# Patient Record
Sex: MALE | Race: WHITE | NOT HISPANIC OR LATINO
[De-identification: names, ages, dates, MRNs, and addresses within clinical notes are randomized per-mention and may not be internally consistent; named-entity substitution may affect disease eponyms.]

---

## 2022-09-21 ENCOUNTER — APPOINTMENT (OUTPATIENT)
Dept: CARDIOLOGY | Facility: CLINIC | Age: 67
End: 2022-09-21

## 2022-09-21 VITALS — WEIGHT: 203.25 LBS | HEIGHT: 71 IN | BODY MASS INDEX: 28.45 KG/M2

## 2022-09-21 DIAGNOSIS — Z82.49 FAMILY HISTORY OF ISCHEMIC HEART DISEASE AND OTHER DISEASES OF THE CIRCULATORY SYSTEM: ICD-10-CM

## 2022-09-21 PROCEDURE — 99205 OFFICE O/P NEW HI 60 MIN: CPT

## 2022-09-21 RX ORDER — ENALAPRIL MALEATE 10 MG/1
10 TABLET ORAL
Refills: 0 | Status: ACTIVE | COMMUNITY

## 2022-09-24 ENCOUNTER — NON-APPOINTMENT (OUTPATIENT)
Age: 67
End: 2022-09-24

## 2022-09-29 ENCOUNTER — APPOINTMENT (OUTPATIENT)
Dept: CARDIOLOGY | Facility: CLINIC | Age: 67
End: 2022-09-29

## 2022-09-29 VITALS
WEIGHT: 200 LBS | HEIGHT: 71 IN | BODY MASS INDEX: 28 KG/M2 | SYSTOLIC BLOOD PRESSURE: 130 MMHG | DIASTOLIC BLOOD PRESSURE: 80 MMHG

## 2022-09-29 PROCEDURE — 93306 TTE W/DOPPLER COMPLETE: CPT

## 2022-10-20 ENCOUNTER — NON-APPOINTMENT (OUTPATIENT)
Age: 67
End: 2022-10-20

## 2022-11-16 ENCOUNTER — OUTPATIENT (OUTPATIENT)
Dept: OUTPATIENT SERVICES | Facility: HOSPITAL | Age: 67
LOS: 1 days | Discharge: HOME | End: 2022-11-16

## 2022-11-16 ENCOUNTER — TRANSCRIPTION ENCOUNTER (OUTPATIENT)
Age: 67
End: 2022-11-16

## 2022-11-16 ENCOUNTER — RESULT REVIEW (OUTPATIENT)
Age: 67
End: 2022-11-16

## 2022-11-16 DIAGNOSIS — I20.9 ANGINA PECTORIS, UNSPECIFIED: ICD-10-CM

## 2022-11-16 PROCEDURE — 75574 CT ANGIO HRT W/3D IMAGE: CPT | Mod: 26,MH

## 2022-11-17 ENCOUNTER — OUTPATIENT (OUTPATIENT)
Dept: OUTPATIENT SERVICES | Facility: HOSPITAL | Age: 67
LOS: 1 days | Discharge: HOME | End: 2022-11-17
Payer: MEDICARE

## 2022-11-17 ENCOUNTER — RESULT REVIEW (OUTPATIENT)
Age: 67
End: 2022-11-17

## 2022-11-17 DIAGNOSIS — I20.9 ANGINA PECTORIS, UNSPECIFIED: ICD-10-CM

## 2022-11-17 PROCEDURE — 0504T: CPT

## 2022-11-18 NOTE — CARDIOLOGY SUMMARY
[de-identified] : 1. Dense calcified plaque precludes evaluation of the mid LAD and proximal left circumflex arteries.\par \par Atherosclerotic disease elsewhere within the coronary arteries contributing up to mild to moderate stenosis within the distal RCA.\par \par The total Agatston coronary artery calcium score equals 1445, which corresponds to 0th percentile for age, gender and ethnicity.\par \par CAD-RADS 2/3N.\par \par This case was sent for CT FFR analysis.\par \par 2. 4 mm subsolid right middle lobe nodule, CT of the chest in 12 months may be obtained for follow-up.\par \par FFR 0.78 LAD and 0.80 LCx

## 2022-11-18 NOTE — DISCUSSION/SUMMARY
[FreeTextEntry1] : pt with family h/o signficant with ? anginal symptoms \par ekg mildly abnormal \par get CTA as significant family /o \par get echo \par get bloodwork \par \par 11/18/22: \par pt with signifcant with LAD disease and 0.78 \par CAC: 1448\par pt for cardiac cath \par pt told not to be exertional.

## 2022-12-04 ENCOUNTER — LABORATORY RESULT (OUTPATIENT)
Age: 67
End: 2022-12-04

## 2022-12-07 ENCOUNTER — OUTPATIENT (OUTPATIENT)
Dept: OUTPATIENT SERVICES | Facility: HOSPITAL | Age: 67
LOS: 1 days | Discharge: HOME | End: 2022-12-07

## 2022-12-07 VITALS
HEART RATE: 63 BPM | OXYGEN SATURATION: 98 % | RESPIRATION RATE: 18 BRPM | TEMPERATURE: 97 F | SYSTOLIC BLOOD PRESSURE: 130 MMHG | DIASTOLIC BLOOD PRESSURE: 78 MMHG

## 2022-12-07 LAB
ANION GAP SERPL CALC-SCNC: 6 MMOL/L — LOW (ref 7–14)
BUN SERPL-MCNC: 25 MG/DL — HIGH (ref 10–20)
CALCIUM SERPL-MCNC: 9.5 MG/DL — SIGNIFICANT CHANGE UP (ref 8.4–10.4)
CHLORIDE SERPL-SCNC: 105 MMOL/L — SIGNIFICANT CHANGE UP (ref 98–110)
CO2 SERPL-SCNC: 28 MMOL/L — SIGNIFICANT CHANGE UP (ref 17–32)
CREAT SERPL-MCNC: 1.4 MG/DL — SIGNIFICANT CHANGE UP (ref 0.7–1.5)
EGFR: 55 ML/MIN/1.73M2 — LOW
GLUCOSE SERPL-MCNC: 93 MG/DL — SIGNIFICANT CHANGE UP (ref 70–99)
HCT VFR BLD CALC: 40.2 % — LOW (ref 42–52)
HCT VFR BLD CALC: 45.1 % — SIGNIFICANT CHANGE UP (ref 42–52)
HGB BLD-MCNC: 12.7 G/DL — LOW (ref 14–18)
HGB BLD-MCNC: 14.7 G/DL — SIGNIFICANT CHANGE UP (ref 14–18)
MCHC RBC-ENTMCNC: 29.5 PG — SIGNIFICANT CHANGE UP (ref 27–31)
MCHC RBC-ENTMCNC: 30 PG — SIGNIFICANT CHANGE UP (ref 27–31)
MCHC RBC-ENTMCNC: 31.6 G/DL — LOW (ref 32–37)
MCHC RBC-ENTMCNC: 32.6 G/DL — SIGNIFICANT CHANGE UP (ref 32–37)
MCV RBC AUTO: 92 FL — SIGNIFICANT CHANGE UP (ref 80–94)
MCV RBC AUTO: 93.3 FL — SIGNIFICANT CHANGE UP (ref 80–94)
NRBC # BLD: 0 /100 WBCS — SIGNIFICANT CHANGE UP (ref 0–0)
NRBC # BLD: 0 /100 WBCS — SIGNIFICANT CHANGE UP (ref 0–0)
PLATELET # BLD AUTO: 156 K/UL — SIGNIFICANT CHANGE UP (ref 130–400)
PLATELET # BLD AUTO: 181 K/UL — SIGNIFICANT CHANGE UP (ref 130–400)
POTASSIUM SERPL-MCNC: 4.9 MMOL/L — SIGNIFICANT CHANGE UP (ref 3.5–5)
POTASSIUM SERPL-SCNC: 4.9 MMOL/L — SIGNIFICANT CHANGE UP (ref 3.5–5)
RBC # BLD: 4.31 M/UL — LOW (ref 4.7–6.1)
RBC # BLD: 4.9 M/UL — SIGNIFICANT CHANGE UP (ref 4.7–6.1)
RBC # FLD: 12.4 % — SIGNIFICANT CHANGE UP (ref 11.5–14.5)
RBC # FLD: 12.4 % — SIGNIFICANT CHANGE UP (ref 11.5–14.5)
SODIUM SERPL-SCNC: 139 MMOL/L — SIGNIFICANT CHANGE UP (ref 135–146)
WBC # BLD: 5.37 K/UL — SIGNIFICANT CHANGE UP (ref 4.8–10.8)
WBC # BLD: 5.72 K/UL — SIGNIFICANT CHANGE UP (ref 4.8–10.8)
WBC # FLD AUTO: 5.37 K/UL — SIGNIFICANT CHANGE UP (ref 4.8–10.8)
WBC # FLD AUTO: 5.72 K/UL — SIGNIFICANT CHANGE UP (ref 4.8–10.8)

## 2022-12-07 PROCEDURE — 93010 ELECTROCARDIOGRAM REPORT: CPT

## 2022-12-07 PROCEDURE — 93010 ELECTROCARDIOGRAM REPORT: CPT | Mod: 77

## 2022-12-07 PROCEDURE — 92928 PRQ TCAT PLMT NTRAC ST 1 LES: CPT | Mod: LC

## 2022-12-07 PROCEDURE — 93571 IV DOP VEL&/PRESS C FLO 1ST: CPT | Mod: 26,LD

## 2022-12-07 PROCEDURE — 93458 L HRT ARTERY/VENTRICLE ANGIO: CPT | Mod: 26,XU

## 2022-12-07 RX ORDER — ATORVASTATIN CALCIUM 80 MG/1
1 TABLET, FILM COATED ORAL
Qty: 0 | Refills: 0 | DISCHARGE

## 2022-12-07 RX ORDER — CLOPIDOGREL BISULFATE 75 MG/1
1 TABLET, FILM COATED ORAL
Qty: 30 | Refills: 2
Start: 2022-12-07 | End: 2023-03-06

## 2022-12-07 RX ORDER — ATORVASTATIN CALCIUM 80 MG/1
1 TABLET, FILM COATED ORAL
Qty: 30 | Refills: 2
Start: 2022-12-07 | End: 2023-03-06

## 2022-12-07 NOTE — H&P CARDIOLOGY - HISTORY OF PRESENT ILLNESS
Patient is a 67y Male who presents to the cardiology department for LHC.       Pre cath note:    indication:  [ ] STEMI                [ ] NSTEMI                 [ ] Acute coronary syndrome                     [ ]Unstable Angina   [ ] high risk  [ ] intermediate risk  [ ] low risk                     [X ] Stable Angina     non-invasive testing:                          Date:       11/16/22              result: [ ] high risk  [x intermediate risk  [ ] low risk    Anti- Anginal medications:                    [ ] not used                       [X ] used                   [ ] not used but strong indication not to use    Ejection Fraction                   [ ] <29            [ ] 30-39%   [ ] 40-49%     [X ]>50%    CHF                   [ ] active (within last 14 days on meds   [ ] Chronic (on meds but no exacerbation)    COPD                   [ ] mild (on chronic bronchodilators)  [ ] moderate (on chronic steroid therapy)      [ ] severe (indication for home O2 or PACO2 >50)    Other risk factors:                       [ ] Previous MI                     [ ] CVA/ stroke                    [ ] carotid stent/ CEA                    [ ] PVD/PAD- (arterial aneurysm, non-palpable pulses, tortuous vessel with inability to insert catheter, infra-renal dissection, renal or subclavian artery stenosis)                    [ ] diabetic                    [ ] previous CABG                    [ ] Renal Failure        cc/hr x 1 hour prior to cath   EF 64% 10/2022  Adjusted Bleeding Score: LABS PENDING     SUBJ:  66 y/o male presents for LHC.  Pt c/o chest discomfort described as warmth with associated CEJA. Pt had CCTA     CT Angio Heart and Coronaries w/ IV Cont (11.16.22 @ 12:01)   INTERPRETATION:  CLINICAL INDICATION: Chest pain.      INTERPRETATION:    Calcium Score:    Vessel              Calcium Score    =======================================================  LM:                    32  LAD:                 581  LCX:   424  RCA:                 408  =======================================================  Total:                1445      CORONARY CT ANGIOGRAM:    There is a  right   dominant coronary arterial system.    Left Main Artery: Focal calcified plaque without significant stenosis.    Left Anterior Descending Artery: Mixed plaque proximally without   significant stenosis. Dense calcified plaque within the mid segment   limiting intraluminal evaluation.    Left Circumflex Artery: Dense calcified plaqueproximally limiting   intraluminal evaluation.    Right Coronary Artery: Scattered areas of calcified and noncalcified   plaque contributing up to mild to moderate stenosis distally.    CARDIAC MORPHOLOGY: The cardiac chambers are normal in size.  There is no   pericardial effusion.    IMAGED AORTA: The thoracic aorta is normal in caliber.    IMAGED EXTRACARDIAC FINDINGS:  Left Bochdalek hernia.  4 mm subsolid right middle lobe nodule abutting the minor fissure image   81 series 16.    IMPRESSION:    1. Dense calcified plaque precludes evaluation of the mid LAD and   proximal left circumflex arteries.    Atherosclerotic disease elsewhere within the coronary arteries   contributing up to mild to moderate stenosis within the distal RCA.    The total Agatston coronary artery calcium score equals 1445, which   corresponds to 0th percentile for age, gender and ethnicity.    CAD-RADS 2/3N.    This case was sent for CT FFR analysis.    2. 4 mm subsolid right middle lobe nodule, CT of the chest in 12 months   may be obtained for follow-up.       CT Fractional Flow Reserve (FFR-CT) Data Prep Transmission And Analysis Interpretation (11.17.22 @ 09:32)   ACC: [id number] EXAM:  FFR CT DATA PREP AND ANALYSIS#                          INTERPRETATION:  FFR-CT CORONARY ANALYSIS    CLINICAL INDICATION: Chest pain with abnormal coronary CCTA requiring   functional analysis.  Pertinent Coronary CTA findings: Mild to moderate distal RCA stenosis.   Dense calcified plaque within the coronary arteries limiting intraluminal   evaluation.    FINDINGS:    Left Main: End vessel CT-FFR value of 0.97. No evidence of a   hemodynamically significant focal lesion.    LAD: Progressive pressure loss with a distal vessel value of 0.71, likely   due to plaque burden versus poor vascular health.    LCX: End vessel CT-FFR value of 0.88. No evidence of a hemodynamically   significant focal lesion.    OM1: CT FFR distal to a proximal lesion is 0.80, with a delta is FFR of   0.13, likely hemodynamically significant.    RCA: End vessel CT-FFR value of 0.90. No evidence of a hemodynamically   significant focal lesion.      IMPRESSION:    Focal hemodynamically significant lesion within the first obtuse marginal   branch.    Progressive pressure loss with a distal vessel value of 0.71, likely due   to plaque burden versus poor vascular health    Reference FFRCT Values:    a. >0.80 (distal to the stenosis): Not hemodynamically significant    b. >0.76-0.8 (distal to the stenosis): Borderline hemodynamically   significant    c. ?0.75 (distal to the stenosis): Hemodynamically significant  This document has been electronically signed. Nov 18 2022  9:03AM        Past Medical History:  HTH  HLD      Past Surgical History:    R hip replace  Hernia repair    REVIEW OF SYSTEMS:  CONSTITUTIONAL: No fever, weight loss, or fatigue  CARDIOLOGY: Patient denies chest pain, shortness of breath or syncopal episodes.   RESPIRATORY: denies shortness of breath, wheezing.   NEUROLOGICAL: NO weakness, no focal deficits to report.  GI: no BRBPR, no N,V,diarrhea.     PHYSICAL EXAM:  · CONSTITUTIONAL:	Well-developed, well nourished     · RESPIRATORY:   airway patent; breath sounds equal; good air movement; respirations non-labored; clear to auscultation bilaterally; no chest wall tenderness; no intercostal retractions; no rales,rhonchi or wheeze  · CARDIOVASCULAR	regular rate and rhythm  no rub  no murmur    · EXTREMITIES: No cyanosis, clubbing or edema  · VASCULAR: 	Equal and normal pulses (carotid, femoral, dorsalis pedis)  	  Juan Manuel Test WNL

## 2022-12-07 NOTE — CHART NOTE - NSCHARTNOTEFT_GEN_A_CORE
PRE-OP DIAGNOSIS:  CAD - unstable Angina (AUC 8)      PROCEDURE:     [x] Coronary Angiogram     [x] LHC     [] LVG     [] RHC     [] Intervention (see below)         PHYSICIAN:  Dr. watson    Fellow:  Dr. Nava, Dr. Webb    PROCEDURE DESCRIPTION:     Consent:      [x] Patient     [] Family Member     []  Used        Anesthesia:     [] General     [x] Sedation     [x] Local        Access & Closure:     [x] 6-Fr Radial Artery ==> D-STAT    [] Fr Femoral Artery     [] Fr Femoral Vein     [] Fr Brachial Vein       IV Contrast: 150mL        Intervention: PCI w/ balloon angioplasty and SYNERGY XD 2.92C54VX MIRTA to prox OM1. FFR to prox LAD.      Implants: SYNERGY XD 2.33M14TI       FINDINGS:     Coronary Dominance: Right      LM: Mild luminal irregularities    LAD: Diffuse 60% stenosis in the proximal segment of the vessel. FFR 0.74.    CX: Mild diffuse disease  OM1: Proximal hazy 80% lesion s/p PCI w/ balloon angioplasty and MIRTA.      RCA: Mild diffuse disease  RPDA: Mild diffuse disease       LVEDP: mmHg     EF: 60% on 2D echo       ESTIMATED BLOOD LOSS: < 10 mL        CONDITION:     [x] Good     [] Fair     [] Critical        SPECIMEN REMOVED: N/A       POST-OP DIAGNOSIS:      [] Normal Coronary Angiogram     [] Mild Coronary Artery Disease (< 50% stenosis)     [x] 2-Vessel Coronary Artery Disease (OM1 s/p PCI w/ balloon angioplasty and SYNERGY XD 2.86A58JX MIRTA, Prox LDA 60% stenosis)       PLAN OF CARE:     [x] D/C Home Today     [] Return to In-patient bed     [] Admit for observation     [x] Return for Staged Procedure     [] CT Surgery Consult     [x] Medications: ASA/Plavix/BB/ARB. Increase Lipitor to 80mg.    [x] IV Fluids: 200cc/hr x 4 hours PRE-OP DIAGNOSIS:  CAD - unstable Angina (AUC 8)      PROCEDURE:     [x] Coronary Angiogram     [x] LHC     [] LVG     [] RHC     [] Intervention (see below)         PHYSICIAN:  Dr. watson    Fellow:  Dr. Nava, Dr. Webb    PROCEDURE DESCRIPTION:     Consent:      [x] Patient     [] Family Member     []  Used        Anesthesia:     [] General     [x] Sedation     [x] Local        Access & Closure:     [x] 6-Fr Radial Artery ==> D-STAT    [] Fr Femoral Artery     [] Fr Femoral Vein     [] Fr Brachial Vein       IV Contrast: 150mL        Intervention: PCI w/ balloon angioplasty and SYNERGY XD 2.05P72YU MIRTA to prox OM1. FFR to prox LAD.      Implants: SYNERGY XD 2.96U88KB       FINDINGS:     Coronary Dominance: Right      LM: Mild luminal irregularities    LAD: Diffuse 60% stenosis in the proximal segment of the vessel. FFR 0.74.    CX: Mild diffuse disease  OM1: Proximal hazy 80% lesion s/p PCI w/ balloon angioplasty and MIRTA.      RCA: Mild diffuse disease  RPDA: Mild diffuse disease       LVEDP: mmHg     EF: 60% on 2D echo       ESTIMATED BLOOD LOSS: < 10 mL        CONDITION:     [x] Good     [] Fair     [] Critical        SPECIMEN REMOVED: N/A       POST-OP DIAGNOSIS:      [] Normal Coronary Angiogram     [] Mild Coronary Artery Disease (< 50% stenosis)     [x] 2-Vessel Coronary Artery Disease (OM1 s/p PCI w/ balloon angioplasty and SYNERGY XD 2.55F56DB MIRTA, Prox LDA 60% stenosis)       PLAN OF CARE:     [x] D/C Home Today     [] Return to In-patient bed     [] Admit for observation     [x] Return for Staged Procedure     [] CT Surgery Consult     [x] Medications: ASA/Plavix/BB/ACEI. Increase Lipitor to 80mg.    [x] IV Fluids: 200cc/hr x 4 hours PRE-OP DIAGNOSIS:  CAD - unstable Angina (AUC 8)      PROCEDURE:     [x] Coronary Angiogram     [x] LHC     [] LVG     [] RHC     [] Intervention (see below)         PHYSICIAN:  Dr. watson    Fellow:  Dr. Nava, Dr. Webb    PROCEDURE DESCRIPTION:     Consent:      [x] Patient     [] Family Member     []  Used        Anesthesia:     [] General     [x] Sedation     [x] Local        Access & Closure:     [x] 6-Fr Radial Artery ==> D-STAT    [] Fr Femoral Artery     [] Fr Femoral Vein     [] Fr Brachial Vein       IV Contrast: 150mL        Intervention: PCI w/ balloon angioplasty and SYNERGY XD 2.46R32OZ MIRTA to prox OM1. FFR to prox LAD.      Implants: SYNERGY XD 2.64I41SU       FINDINGS:     Coronary Dominance: Right      LM: Mild luminal irregularities    LAD: Diffuse 60% stenosis in the proximal segment of the vessel. FFR 0.74.    CX: Mild diffuse disease  OM1: Proximal hazy 80% lesion s/p PCI w/ balloon angioplasty and MIRTA.      RCA: Mild diffuse disease  RPDA: Mild diffuse disease       LVEDP: mmHg     EF: 60% on 2D echo       ESTIMATED BLOOD LOSS: < 10 mL        CONDITION:     [x] Good     [] Fair     [] Critical        SPECIMEN REMOVED: N/A       POST-OP DIAGNOSIS:      [] Normal Coronary Angiogram     [] Mild Coronary Artery Disease (< 50% stenosis)     [x] 2-Vessel Coronary Artery Disease (OM1 s/p PCI w/ balloon angioplasty and SYNERGY XD 2.96X68NA MIRTA, Prox LDA 60% stenosis)       PLAN OF CARE:     [x] D/C Home Today     [] Return to In-patient bed     [] Admit for observation     [x] Return for Staged Procedure     [] CT Surgery Consult     [x] Medications: ASA/Plavix/BB/ACEI. Increase Lipitor to 40mg.    [x] IV Fluids: 200cc/hr x 4 hours PRE-OP DIAGNOSIS:  CAD - unstable Angina (AUC 8)      PROCEDURE:     [x] Coronary Angiogram     [x] LHC     [] LVG     [] RHC     [] Intervention (see below)         PHYSICIAN:  Dr. watson    Fellow:  Dr. Nava, Dr. Webb    PROCEDURE DESCRIPTION:     Consent:      [x] Patient     [] Family Member     []  Used        Anesthesia:     [] General     [x] Sedation     [x] Local        Access & Closure:     [x] 6-Fr Radial Artery ==> D-STAT    [] Fr Femoral Artery     [] Fr Femoral Vein     [] Fr Brachial Vein       IV Contrast: 150mL        Intervention: PCI w/ balloon angioplasty and SYNERGY XD 2.57J92HT MIRTA to prox OM1. FFR to prox LAD.      Implants: SYNERGY XD 2.62K47XG       FINDINGS:     Coronary Dominance: Right      LM: Mild luminal irregularities    LAD: Diffuse 60% stenosis in the proximal segment of the vessel. FFR 0.74.    CX: Mild diffuse disease  OM1: Proximal hazy 80% lesion s/p PCI w/ balloon angioplasty and MIRTA.      RCA: Mild diffuse disease  RPDA: Mild diffuse disease       LVEDP: mmHg     EF: 60% on 2D echo       ESTIMATED BLOOD LOSS: < 10 mL        CONDITION:     [x] Good     [] Fair     [] Critical        SPECIMEN REMOVED: N/A       POST-OP DIAGNOSIS:      [] Normal Coronary Angiogram     [] Mild Coronary Artery Disease (< 50% stenosis)     [x] 2-Vessel Coronary Artery Disease (OM1 s/p PCI w/ balloon angioplasty and SYNERGY XD 2.31R05VR MIRTA, Prox LAD 60% stenosis)       PLAN OF CARE:     [x] D/C Home Today     [] Return to In-patient bed     [] Admit for observation     [x] Return for Staged Procedure     [] CT Surgery Consult     [x] Medications: ASA/Plavix/BB/ACEI. Increase Lipitor to 40mg.    [x] IV Fluids: 200cc/hr x 4 hours

## 2022-12-07 NOTE — PROGRESS NOTE ADULT - SUBJECTIVE AND OBJECTIVE BOX
Cardiology Follow up    OSCAR MATT   67y Male  PAST MEDICAL & SURGICAL HISTORY:  Mild HTN    Hyperlipidemia    Inguinal hernia           HPI:  Patient is a 67y Male who presents to the cardiology department for LHC.         SUBJ:  68 y/o male presents for LHC.  Pt c/o chest discomfort described as warmth with associated CEJA. Pt had CCTA       Allergies    No Known Allergies    Intolerances      Patient seen and examined at bedside. No acute events   Patient without complaints. Pt ambulated without issues/symptoms  Denies CP, SOB, palpitations, or dizziness  No events on telemetry     Vital Signs Last 24 Hrs    HR:  60  BP:  110/74  RR: 18   SpO2: 98%     Parameters below as of 07 Dec 2022 07:38  Patient On (Oxygen Delivery Method): room air        REVIEW OF SYSTEMS:          All negative except as mentioned in HPI    PHYSICAL EXAM:           CONSTITUTIONAL: Well-developed; well-nourished; in no acute distress  	SKIN: warm, dry  	HEAD: Normocephalic; atraumatic  	EYES: PERRL.  	ENT: No nasal discharge, airway clear, mucous membranes moist  	NECK: Supple; non tender.  	CARD: +S1, +S2, no murmurs, gallops, or rubs. Regular rate and rhythm    	RESP: No wheezes, rales or rhonchi. CTA B/L  	ABD: soft ntnd, + BS x 4 quadrants  	EXT: moves all extremities,  no clubbing, cyanosis or edema  	NEURO: Alert and oriented x3, no focal deficits          PSYCH: Cooperative, appropriate          VASCULAR:  + Rad / + PTs / +  DPs          EXTREMITY:               	   Right Radial: Dressing D/C/I, D Stat in place, access site soft, no hematoma, no pain, + pulses, no sign of infection, no numbness            ECG:   PENDING 1400    CBC PENDING 1400     LABS:                        14.7   5.72  )-----------( 181      ( 07 Dec 2022 08:14 )             45.1     12-07    139  |  105  |  25<H>  ----------------------------<  93  4.9   |  28  |  1.4    Ca    9.5      07 Dec 2022 08:14              A/P:  I discussed the case with Cardiologist Dr. PHELAN  and recommend the following:    S/P PCI:      Intervention: PCI w/ balloon angioplasty and SYNERGY XD 2.71V66VG MIRTA to prox OM1. FFR to prox LAD.      Implants: SYNERGY XD 2.37X67TI       FINDINGS:     Coronary Dominance: Right      LM: Mild luminal irregularities    LAD: Diffuse 60% stenosis in the proximal segment of the vessel. FFR 0.74.    CX: Mild diffuse disease  OM1: Proximal hazy 80% lesion s/p PCI w/ balloon angioplasty and MIRTA.      RCA: Mild diffuse disease  RPDA: Mild diffuse disease       LVEDP: mmHg     EF: 60% on 2D echo       POST-OP DIAGNOSIS:      [x] 2-Vessel Coronary Artery Disease (OM1 s/p PCI w/ balloon angioplasty and SYNERGY XD 2.70Y38XE MIRTA, Prox LDA 60% stenosis)                          CBC/ECG at 1400                   NS  200 cc/hr x  6hr  	         - Eval BP on follow up visit for addition of Amlodipine 2.5 mg po daily.                                       Continue DAPT ( Aspirin 81 mg PO Daily and Plavix 75 mg po daily ),  ACEi, B-Blocker, Statin Therapy                   INCREASE LIPITOR TO 40mg po DAILY                   Patient given 30 day supply of ( Aspirin 81 mg daily and Plavix 75 mg daily ) to take at home                   Patient agreeing to take DAPT for at least one year or as directed by cardiologist                    Pt given instructions on importance of taking antiplatelet medication or risk acute stent thrombosis/death                   Post cath instructions, access site care and activity restrictions reviewed with patient                     Discussed with patient to return to hospital if experience chest pain, shortness breath, dizziness and site bleeding                   Aggressive risk factor modification, diet counseling, smoking cessation discussed with patient                       Can discharge patient from cardiac standpoint at 1600  after ambulating without symptoms and access site wnl, ECG and blood work reviewed                    Benefits of Cardiac Rehab discussed with patient, All documents sent to [facility]. Patient instructed to call and make first                               appointment after first f/u visit with Cardiologist                    Follow up with Cardiology Dr. Root in  two weeks.  Instructed to call and make an appointment                      Discharge instructions as follows, when ready to d/c:                   - Continue medical regimen as prescribed to prevent chest pain                   - Continue dual anti-platelet therapy, beta blocker, statin                   - If you are diabetic and taking medication containing Metformin, do not take them for 48 hours after the procedure                   - Instructed to call 911 if chest pain, shortness of breath or bleeding from access site                   - No heavy lifting >10lbs x 1 week                   - No driving x 24 hours                   - No baths, swimming pools x 1 week, may shower                   - Low sodium low fat low cholesterol diet                   - Follow-up with Cardiologist in 1-2 weeks after discharge                                        Cardiology Follow up    OSCAR MATT   67y Male  PAST MEDICAL & SURGICAL HISTORY:  Mild HTN    Hyperlipidemia    Inguinal hernia           HPI:  Patient is a 67y Male who presents to the cardiology department for LHC.         SUBJ:  66 y/o male presents for LHC.  Pt c/o chest discomfort described as warmth with associated CEJA. Pt had CCTA       Allergies    No Known Allergies    Intolerances      Patient seen and examined at bedside. No acute events   Patient without complaints. Pt ambulated without issues/symptoms  Denies CP, SOB, palpitations, or dizziness  No events on telemetry     Vital Signs Last 24 Hrs    HR:  60  BP:  110/74  RR: 18   SpO2: 98%     Parameters below as of 07 Dec 2022 07:38  Patient On (Oxygen Delivery Method): room air        REVIEW OF SYSTEMS:          All negative except as mentioned in HPI    PHYSICAL EXAM:           CONSTITUTIONAL: Well-developed; well-nourished; in no acute distress  	SKIN: warm, dry  	HEAD: Normocephalic; atraumatic  	EYES: PERRL.  	ENT: No nasal discharge, airway clear, mucous membranes moist  	NECK: Supple; non tender.  	CARD: +S1, +S2, no murmurs, gallops, or rubs. Regular rate and rhythm    	RESP: No wheezes, rales or rhonchi. CTA B/L  	ABD: soft ntnd, + BS x 4 quadrants  	EXT: moves all extremities,  no clubbing, cyanosis or edema  	NEURO: Alert and oriented x3, no focal deficits          PSYCH: Cooperative, appropriate          VASCULAR:  + Rad / + PTs / +  DPs          EXTREMITY:               	   Right Radial: Dressing D/C/I, D Stat in place, access site soft, no hematoma, no pain, + pulses, no sign of infection, no numbness            ECG:   PENDING 1400    CBC PENDING 1400     LABS:                        14.7   5.72  )-----------( 181      ( 07 Dec 2022 08:14 )             45.1     12-07    139  |  105  |  25<H>  ----------------------------<  93  4.9   |  28  |  1.4    Ca    9.5      07 Dec 2022 08:14              A/P:  I discussed the case with Cardiologist Dr. PHELAN  and recommend the following:    S/P PCI:      Intervention: PCI w/ balloon angioplasty and SYNERGY XD 2.18R66NB MIRTA to prox OM1. FFR to prox LAD.      Implants: SYNERGY XD 2.60Y70IT       FINDINGS:     Coronary Dominance: Right      LM: Mild luminal irregularities    LAD: Diffuse 60% stenosis in the proximal segment of the vessel. FFR 0.74.    CX: Mild diffuse disease  OM1: Proximal hazy 80% lesion s/p PCI w/ balloon angioplasty and MIRTA.      RCA: Mild diffuse disease  RPDA: Mild diffuse disease       LVEDP: mmHg     EF: 60% on 2D echo       POST-OP DIAGNOSIS:      [x] 2-Vessel Coronary Artery Disease (OM1 s/p PCI w/ balloon angioplasty and SYNERGY XD 2.89B93XA MIRTA, Prox LDA 60% stenosis)                          CBC/ECG at 1400                   NS  200 cc/hr x  6hr  - Return for staged PCI to LAD 4-6 weeks	         - Eval BP on follow up visit for possible addition of Amlodipine 2.5 mg po daily.                                       Continue DAPT ( Aspirin 81 mg PO Daily and Plavix 75 mg po daily ),  ACEi, B-Blocker, Statin Therapy                   INCREASE LIPITOR TO 40mg po DAILY                   Patient given 30 day supply of ( Aspirin 81 mg daily and Plavix 75 mg daily ) to take at home                   Patient agreeing to take DAPT for at least one year or as directed by cardiologist                    Pt given instructions on importance of taking antiplatelet medication or risk acute stent thrombosis/death                   Post cath instructions, access site care and activity restrictions reviewed with patient                     Discussed with patient to return to hospital if experience chest pain, shortness breath, dizziness and site bleeding                   Aggressive risk factor modification, diet counseling, smoking cessation discussed with patient                       Can discharge patient from cardiac standpoint at 1600  after ambulating without symptoms and access site wnl, ECG and blood work reviewed                    Benefits of Cardiac Rehab discussed with patient, All documents sent to [facility]. Patient instructed to call and make first                               appointment after first f/u visit with Cardiologist                    Follow up with Cardiology Dr. Root in  two weeks.  Instructed to call and make an appointment                      Discharge instructions as follows, when ready to d/c:                   - Continue medical regimen as prescribed to prevent chest pain                   - Continue dual anti-platelet therapy, beta blocker, statin                   - If you are diabetic and taking medication containing Metformin, do not take them for 48 hours after the procedure                   - Instructed to call 911 if chest pain, shortness of breath or bleeding from access site                   - No heavy lifting >10lbs x 1 week                   - No driving x 24 hours                   - No baths, swimming pools x 1 week, may shower                   - Low sodium low fat low cholesterol diet                   - Follow-up with Cardiologist in 1-2 weeks after discharge

## 2022-12-12 DIAGNOSIS — I20.0 UNSTABLE ANGINA: ICD-10-CM

## 2022-12-12 DIAGNOSIS — E78.5 HYPERLIPIDEMIA, UNSPECIFIED: ICD-10-CM

## 2022-12-12 DIAGNOSIS — I10 ESSENTIAL (PRIMARY) HYPERTENSION: ICD-10-CM

## 2022-12-12 DIAGNOSIS — G47.33 OBSTRUCTIVE SLEEP APNEA (ADULT) (PEDIATRIC): ICD-10-CM

## 2022-12-12 DIAGNOSIS — I25.110 ATHEROSCLEROTIC HEART DISEASE OF NATIVE CORONARY ARTERY WITH UNSTABLE ANGINA PECTORIS: ICD-10-CM

## 2022-12-14 ENCOUNTER — APPOINTMENT (OUTPATIENT)
Dept: CARDIOLOGY | Facility: CLINIC | Age: 67
End: 2022-12-14

## 2022-12-14 VITALS — HEIGHT: 71 IN | WEIGHT: 200.25 LBS | BODY MASS INDEX: 28.03 KG/M2

## 2022-12-14 PROBLEM — K40.90 UNILATERAL INGUINAL HERNIA, WITHOUT OBSTRUCTION OR GANGRENE, NOT SPECIFIED AS RECURRENT: Chronic | Status: ACTIVE | Noted: 2022-12-07

## 2022-12-14 PROBLEM — I10 ESSENTIAL (PRIMARY) HYPERTENSION: Chronic | Status: ACTIVE | Noted: 2022-12-07

## 2022-12-14 PROBLEM — E78.5 HYPERLIPIDEMIA, UNSPECIFIED: Chronic | Status: ACTIVE | Noted: 2022-12-07

## 2022-12-14 PROCEDURE — 99214 OFFICE O/P EST MOD 30 MIN: CPT

## 2022-12-14 RX ORDER — METOPROLOL TARTRATE 100 MG/1
100 TABLET, FILM COATED ORAL
Qty: 2 | Refills: 0 | Status: DISCONTINUED | COMMUNITY
Start: 2022-09-21 | End: 2022-12-14

## 2023-01-15 ENCOUNTER — LABORATORY RESULT (OUTPATIENT)
Age: 68
End: 2023-01-15

## 2023-01-17 VITALS
HEIGHT: 71 IN | DIASTOLIC BLOOD PRESSURE: 67 MMHG | TEMPERATURE: 99 F | OXYGEN SATURATION: 99 % | HEART RATE: 59 BPM | SYSTOLIC BLOOD PRESSURE: 116 MMHG | WEIGHT: 199.96 LBS | RESPIRATION RATE: 16 BRPM

## 2023-01-17 NOTE — H&P CARDIOLOGY - NSICDXPASTSURGICALHX_GEN_ALL_CORE_FT
PAST SURGICAL HISTORY:  H/O hernia repair     History of arthroplasty of right hip     History of percutaneous angioplasty

## 2023-01-17 NOTE — H&P CARDIOLOGY - NSICDXFAMILYHX_GEN_ALL_CORE_FT
FAMILY HISTORY:  Grandparent  Still living? Unknown  FH: myocardial infarction, Age at diagnosis: 61-70

## 2023-01-17 NOTE — H&P CARDIOLOGY - HISTORY OF PRESENT ILLNESS
HPI    Patient is a 67y Male with PMH of HTN, DLD, inguinal hernia s/p repair, 2 vessel CAD s/p stent 12/2022 at [facility] (OM1 s/p PCI w/ balloon angioplasty and SYNERGY XD 2.05F97QU MIRTA, Prox LDA 60% stenosis) who presents to the cardiology department for staged LHC for stent to LAD.      Vital Signs Last 24 Hrs  T(C): --  T(F): --  HR: --  BP: --  BP(mean): --  RR: --  SpO2: --        Pre cath note:  indication:  [ ] STEMI                [ ] NSTEMI                 [ ] Acute coronary syndrome                   [ ]Unstable Angina   [ ] high risk  [ ] intermediate risk  [ ] low risk                   [ ] Stable Angina     non-invasive testing:                          Date:                     result: [ ] high risk  [ ] intermediate risk  [ ] low risk    Anti- Anginal medications:                    [ ] not used                       [ ] used                   [ ] not used but strong indication not to use    Ejection Fraction                   [ ] <29            [ ] 30-39%   [ ] 40-49%     [ ]>50%    CHF                   [ ] active (within last 14 days on meds   [ ] Chronic (on meds but no exacerbation)    COPD                   [ ] mild (on chronic bronchodilators)  [ ] moderate (on chronic steroid therapy)      [ ] severe (indication for home O2 or PACO2 >50)    Other risk factors:                     [ ] Previous MI                     [ ] CVA/ stroke                    [ ] carotid stent/ CEA                    [ ] PVD/PAD- (arterial aneurysm, non-palpable pulses, tortuous vessel with inability to insert catheter, infra-renal dissection, renal or subclavian artery stenosis)                    [ ] diabetic                    [ ] previous CABG                    [ ] Renal Failure     Bleeding Risk:               RIGHT RADIAL ARTERY EVALUATION:  SEGUNDO TEST: [] Negative          [] Positive  BARBEAU TEST: [] Class A           [] Class B           [] Class C            [] Class D    REVIEW OF SYSTEMS:  CONSTITUTIONAL: No fever, weight loss, or fatigue  CARDIOLOGY: PAtient denies chest pain, shortness of breath or syncopal episodes.   RESPIRATORY: denies shortness of breath, wheezing  NEUROLOGICAL: NO weakness, no focal deficits to report.  ENDOCRINOLOGICAL: no recent change in diabetic medications.   GI: no BRBPR, no N,V,diarrhea.     PHYSICAL EXAM:  · CONSTITUTIONAL:	Well-developed, well nourished    ·RESPIRATORY:   airway patent; breath sounds equal; good air movement; respirations non-labored; clear to auscultation bilaterally; no chest wall tenderness; no intercostal retractions; no rales,rhonchi or wheeze  · CARDIOVASCULAR	regular rate and rhythm  no rub  no murmur  normal PMI  · EXTREMITIES: No cyanosis, clubbing or edema  · VASCULAR: 	Equal and normal pulses (carotid, femoral, dorsalis pedis)  	      EF: 60%  EKG:  HPI    Patient is a 67y Male with PMH of HTN, DLD, inguinal hernia s/p repair, 2 vessel CAD s/p stent 12/2022 at [facility] (OM1 s/p PCI w/ balloon angioplasty and SYNERGY XD 2.82R75ZP MIRTA, Prox LDA 60% stenosis) who presents to the cardiology department for staged LHC for stent to LAD.      Vital Signs Last 24 Hrs  T(C): --  T(F): --  HR: --  BP: --  BP(mean): --  RR: --  SpO2: --        Pre cath note:  indication:  [ ] STEMI                [ ] NSTEMI                 [ ] Acute coronary syndrome                   [ ]Unstable Angina   [ ] high risk  [ ] intermediate risk  [ ] low risk                   [ ] Stable Angina     non-invasive testing:                          Date:                     result: [ ] high risk  [ ] intermediate risk  [ ] low risk    Anti- Anginal medications:                    [ ] not used                       [ ] used                   [ ] not used but strong indication not to use    Ejection Fraction                   [ ] <29            [ ] 30-39%   [ ] 40-49%     [ ]>50%    CHF                   [ ] active (within last 14 days on meds   [ ] Chronic (on meds but no exacerbation)    COPD                   [ ] mild (on chronic bronchodilators)  [ ] moderate (on chronic steroid therapy)      [ ] severe (indication for home O2 or PACO2 >50)    Other risk factors:                     [ ] Previous MI                     [ ] CVA/ stroke                    [ ] carotid stent/ CEA                    [ ] PVD/PAD- (arterial aneurysm, non-palpable pulses, tortuous vessel with inability to insert catheter, infra-renal dissection, renal or subclavian artery stenosis)                    [ ] diabetic                    [ ] previous CABG                    [ ] Renal Failure     Bleeding Risk: 1.2%        RIGHT RADIAL ARTERY EVALUATION:  SEGUNDO TEST: [] Negative          [] Positive  BARBEAU TEST: [] Class A           [] Class B           [] Class C            [] Class D    REVIEW OF SYSTEMS:  CONSTITUTIONAL: No fever, weight loss, or fatigue  CARDIOLOGY: PAtient denies chest pain, shortness of breath or syncopal episodes.   RESPIRATORY: denies shortness of breath, wheezing  NEUROLOGICAL: NO weakness, no focal deficits to report.  ENDOCRINOLOGICAL: no recent change in diabetic medications.   GI: no BRBPR, no N,V,diarrhea.     PHYSICAL EXAM:  · CONSTITUTIONAL:	Well-developed, well nourished    ·RESPIRATORY:   airway patent; breath sounds equal; good air movement; respirations non-labored; clear to auscultation bilaterally; no chest wall tenderness; no intercostal retractions; no rales,rhonchi or wheeze  · CARDIOVASCULAR	regular rate and rhythm  no rub  no murmur  normal PMI  · EXTREMITIES: No cyanosis, clubbing or edema  · VASCULAR: 	Equal and normal pulses (carotid, femoral, dorsalis pedis)  	      EF: 60% 9/2022  EKG:    Patient is a 67y Male with PMH of HTN, HLD, 2 vessel CAD s/p PCI/MIRTA stent 12/7/22 at [facility] (OM1 s/p PCI w/ balloon angioplasty and SYNERGY XD 2.67X35YF MIRTA, Prox LDA 60% stenosis), who presents today for recommended staged PCI of LAD.  Pt states his symptoms have improved, however, he still occasionally feels a "warm sensation in the chest" occurring independent of any activity.    Pre cath note:  indication:  [ ] STEMI                [ ] NSTEMI                 [ ] Acute coronary syndrome                   [x ]Unstable Angina   [ ] high risk  [ ] intermediate risk  [ ] low risk                   [ ] Stable Angina     non-invasive testing:                          Date:                     result: [ ] high risk  [ ] intermediate risk  [ ] low risk    Anti- Anginal medications:                    [ ] not used                       [ x] used                   [ ] not used but strong indication not to use    Ejection Fraction                   [ ] <29            [ ] 30-39%   [ ] 40-49%     [x ]>50%    CHF                   [ ] active (within last 14 days on meds   [ ] Chronic (on meds but no exacerbation)    COPD                   [ ] mild (on chronic bronchodilators)  [ ] moderate (on chronic steroid therapy)      [ ] severe (indication for home O2 or PACO2 >50)    Other risk factors:                     [ ] Previous MI                     [ ] CVA/ stroke                    [ ] carotid stent/ CEA                    [ ] PVD/PAD- (arterial aneurysm, non-palpable pulses, tortuous vessel with inability to insert catheter, infra-renal dissection, renal or subclavian artery stenosis)                    [ ] diabetic                    [ ] previous CABG                    [ ] Renal Failure     Bleeding Risk: 1.3%        RIGHT RADIAL ARTERY EVALUATION:  SEGUNDO TEST: [] Negative          [] Positive  BARBEAU TEST: [] Class A           [] Class B           [] Class C            [] Class D    REVIEW OF SYSTEMS:  CONSTITUTIONAL: No fever, weight loss, or fatigue  CARDIOLOGY: PAtient denies chest pain, shortness of breath or syncopal episodes.   RESPIRATORY: denies shortness of breath, wheezing  NEUROLOGICAL: NO weakness, no focal deficits to report.  ENDOCRINOLOGICAL: no recent change in diabetic medications.   GI: no BRBPR, no N,V,diarrhea.     PHYSICAL EXAM:  · CONSTITUTIONAL:	Well-developed, well nourished    ·RESPIRATORY:   airway patent; breath sounds equal; good air movement; respirations non-labored; clear to auscultation bilaterally; no chest wall tenderness; no intercostal retractions; no rales,rhonchi or wheeze  · CARDIOVASCULAR	regular rate and rhythm  no rub  no murmur  normal PMI  · EXTREMITIES: No cyanosis, clubbing or edema  · VASCULAR: 	Equal and normal pulses (carotid, femoral, dorsalis pedis)  	      EF: 60% 9/2022  EKG:

## 2023-01-17 NOTE — H&P CARDIOLOGY - COMMENTS
67y Male with PMH of HTN, HLD, 2 vessel CAD s/p PCI/MIRTA stent 12/7/22 at [facility] (OM1 s/p PCI w/ balloon angioplasty and SYNERGY XD 2.37I90VG MIRTA, Prox LDA 60% stenosis), who presents today for recommended staged PCI of LAD. 67y Male with PMH of HTN, HLD, 2 vessel CAD s/p PCI/MIRTA stent 12/7/22 at [facility] (OM1 s/p PCI w/ balloon angioplasty and SYNERGY XD 2.15H18WK MIRTA, Prox LDA 60% stenosis - abn iFR ), who presents today for recommended staged PCI of LAD.

## 2023-01-17 NOTE — H&P CARDIOLOGY - NSICDXPASTMEDICALHX_GEN_ALL_CORE_FT
PAST MEDICAL HISTORY:  CAD (coronary artery disease)     Hyperlipidemia     Inguinal hernia     Mild HTN

## 2023-01-18 ENCOUNTER — OUTPATIENT (OUTPATIENT)
Dept: OUTPATIENT SERVICES | Facility: HOSPITAL | Age: 68
LOS: 1 days | Discharge: HOME | End: 2023-01-18
Payer: MEDICARE

## 2023-01-18 DIAGNOSIS — Z98.890 OTHER SPECIFIED POSTPROCEDURAL STATES: Chronic | ICD-10-CM

## 2023-01-18 LAB
ANION GAP SERPL CALC-SCNC: 8 MMOL/L — SIGNIFICANT CHANGE UP (ref 7–14)
BUN SERPL-MCNC: 25 MG/DL — HIGH (ref 10–20)
CALCIUM SERPL-MCNC: 9.6 MG/DL — SIGNIFICANT CHANGE UP (ref 8.4–10.5)
CHLORIDE SERPL-SCNC: 103 MMOL/L — SIGNIFICANT CHANGE UP (ref 98–110)
CO2 SERPL-SCNC: 28 MMOL/L — SIGNIFICANT CHANGE UP (ref 17–32)
CREAT SERPL-MCNC: 1.3 MG/DL — SIGNIFICANT CHANGE UP (ref 0.7–1.5)
EGFR: 61 ML/MIN/1.73M2 — SIGNIFICANT CHANGE UP
GLUCOSE SERPL-MCNC: 91 MG/DL — SIGNIFICANT CHANGE UP (ref 70–99)
HCT VFR BLD CALC: 39.4 % — LOW (ref 42–52)
HCT VFR BLD CALC: 42 % — SIGNIFICANT CHANGE UP (ref 42–52)
HGB BLD-MCNC: 13.1 G/DL — LOW (ref 14–18)
HGB BLD-MCNC: 14.3 G/DL — SIGNIFICANT CHANGE UP (ref 14–18)
MCHC RBC-ENTMCNC: 30.5 PG — SIGNIFICANT CHANGE UP (ref 27–31)
MCHC RBC-ENTMCNC: 30.7 PG — SIGNIFICANT CHANGE UP (ref 27–31)
MCHC RBC-ENTMCNC: 33.2 G/DL — SIGNIFICANT CHANGE UP (ref 32–37)
MCHC RBC-ENTMCNC: 34 G/DL — SIGNIFICANT CHANGE UP (ref 32–37)
MCV RBC AUTO: 90.1 FL — SIGNIFICANT CHANGE UP (ref 80–94)
MCV RBC AUTO: 91.6 FL — SIGNIFICANT CHANGE UP (ref 80–94)
NRBC # BLD: 0 /100 WBCS — SIGNIFICANT CHANGE UP (ref 0–0)
NRBC # BLD: 0 /100 WBCS — SIGNIFICANT CHANGE UP (ref 0–0)
PLATELET # BLD AUTO: 147 K/UL — SIGNIFICANT CHANGE UP (ref 130–400)
PLATELET # BLD AUTO: 180 K/UL — SIGNIFICANT CHANGE UP (ref 130–400)
POTASSIUM SERPL-MCNC: 5.3 MMOL/L — HIGH (ref 3.5–5)
POTASSIUM SERPL-SCNC: 5.3 MMOL/L — HIGH (ref 3.5–5)
RBC # BLD: 4.3 M/UL — LOW (ref 4.7–6.1)
RBC # BLD: 4.66 M/UL — LOW (ref 4.7–6.1)
RBC # FLD: 12.6 % — SIGNIFICANT CHANGE UP (ref 11.5–14.5)
RBC # FLD: 12.7 % — SIGNIFICANT CHANGE UP (ref 11.5–14.5)
SODIUM SERPL-SCNC: 139 MMOL/L — SIGNIFICANT CHANGE UP (ref 135–146)
WBC # BLD: 4.66 K/UL — LOW (ref 4.8–10.8)
WBC # BLD: 4.96 K/UL — SIGNIFICANT CHANGE UP (ref 4.8–10.8)
WBC # FLD AUTO: 4.66 K/UL — LOW (ref 4.8–10.8)
WBC # FLD AUTO: 4.96 K/UL — SIGNIFICANT CHANGE UP (ref 4.8–10.8)

## 2023-01-18 PROCEDURE — 92928 PRQ TCAT PLMT NTRAC ST 1 LES: CPT | Mod: LD

## 2023-01-18 PROCEDURE — 92978 ENDOLUMINL IVUS OCT C 1ST: CPT | Mod: 26,LD

## 2023-01-18 PROCEDURE — 93010 ELECTROCARDIOGRAM REPORT: CPT | Mod: 77

## 2023-01-18 PROCEDURE — 93010 ELECTROCARDIOGRAM REPORT: CPT

## 2023-01-18 PROCEDURE — 93458 L HRT ARTERY/VENTRICLE ANGIO: CPT | Mod: 26,XU

## 2023-01-18 RX ORDER — ASPIRIN/CALCIUM CARB/MAGNESIUM 324 MG
1 TABLET ORAL
Qty: 0 | Refills: 0 | DISCHARGE

## 2023-01-18 RX ORDER — PANTOPRAZOLE SODIUM 20 MG/1
1 TABLET, DELAYED RELEASE ORAL
Qty: 30 | Refills: 0
Start: 2023-01-18

## 2023-01-18 RX ORDER — CLOPIDOGREL BISULFATE 75 MG/1
1 TABLET, FILM COATED ORAL
Qty: 30 | Refills: 3
Start: 2023-01-18 | End: 2023-05-17

## 2023-01-18 RX ORDER — METOPROLOL TARTRATE 50 MG
1 TABLET ORAL
Qty: 0 | Refills: 0 | DISCHARGE

## 2023-01-18 NOTE — CHART NOTE - NSCHARTNOTEFT_GEN_A_CORE
PRE-OP DIAGNOSIS:    Staged PCI to LAD    PROCEDURE:     [x] Coronary Angiogram   [x] C   [x] Intervention (see below)         PHYSICIAN: Dr. watson      ASSISTANT: Dr. Elijah Reece          PROCEDURE DESCRIPTION:     Consent:      [x] Patient     [] Family Member     []  Used        Anesthesia:     [] General     [x] Sedation     [x] Local        Access & Closure:     [x] 6 Fr right Radial Artery >D-stat          IV Contrast: 170 mL        Intervention:   Shockwave lithotripsy of the proximal LAD  IVUS guided PCI with MIRTA x2 to proximal LAD  AUC score of 7    Implants:   Proximal LAD   SYNERGY XD 3.5X38 mm  SYNERGY XD 4.0 X 8 mm    FINDINGS:     Coronary Dominance:   Right     LM:   no disease     LAD:   Proximal LAD 70% lesion s/p PCI  mid LAD mild disease   Distal LAD mild disease     CX:   Mild disease       OM1  Patent prior stent    RCA:   mild disease        LVEDP: 5 mmHg            ESTIMATED BLOOD LOSS: < 10 mL        CONDITION:     [x] Good     [] Fair     [] Critical        SPECIMEN REMOVED: N/A       POST-OP DIAGNOSIS:    One Vessel Coronary Artery Disease   Successful Shockwave lithotripsy of the proximal LAD  Successful IVUS guided PCI with MIRTA x2 to proximal LAD ( SYNERGY XD 3.5X38 mm and SYNERGY XD 4.0 X 8 mm)  AUC score of 7     PLAN OF CARE:   [x]Observation in the post area for repeat labs  [x] Medications: cont with aspirin, plavix, lipitor and metoprolol    [x] IV Fluids as ordered PRE-OP DIAGNOSIS:    Staged PCI to LAD    PROCEDURE:     [x] Coronary Angiogram   [x] C   [x] Intervention (see below)         PHYSICIAN: Dr. watson      ASSISTANT: Dr. Elijah Reece          PROCEDURE DESCRIPTION:     Consent:      [x] Patient     [] Family Member     []  Used        Anesthesia:     [] General     [x] Sedation     [x] Local        Access & Closure:     [x] 6 Fr right Radial Artery >D-stat          IV Contrast: 170 mL        Intervention:   Shockwave lithotripsy of the proximal LAD  IVUS guided PCI with MIRTA x2 to proximal LAD  AUC score of 7    Implants:   Proximal LAD   SYNERGY XD 3.5X38 mm  SYNERGY XD 4.0 X 8 mm    FINDINGS:     Coronary Dominance:   Right     LM:   no disease     LAD:   Proximal LAD 60-70% lesion s/p PCI  mid LAD mild disease   Distal LAD mild disease     CX:   Mild disease       OM1  Patent prior stent    RCA:   mild disease        LVEDP: 5 mmHg            ESTIMATED BLOOD LOSS: < 10 mL        CONDITION:     [x] Good     [] Fair     [] Critical        SPECIMEN REMOVED: N/A       POST-OP DIAGNOSIS:    One Vessel Coronary Artery Disease   Successful Shockwave lithotripsy of the proximal LAD  Successful IVUS guided PCI with MIRTA x2 to proximal LAD ( SYNERGY XD 3.5X38 mm and SYNERGY XD 4.0 X 8 mm)  AUC score of 7     PLAN OF CARE:   [x]Observation in the post area for repeat labs  [x] Medications: cont with aspirin, plavix, lipitor and metoprolol    [x] IV Fluids as ordered

## 2023-01-18 NOTE — PROGRESS NOTE ADULT - SUBJECTIVE AND OBJECTIVE BOX
Interventional Cardiology Discharge Note:     OSCAR MATT   67y Male    PAST MEDICAL & SURGICAL HISTORY:  Mild HTN    Hyperlipidemia    Inguinal hernia    CAD (coronary artery disease)    History of percutaneous angioplasty    History of arthroplasty of right hip    H/O hernia repair      HPI:    Patient is a 67y Male with PMH of HTN, HLD, 2 vessel CAD s/p PCI/MIRTA stent 12/7/22 at [facility] (OM1 s/p PCI w/ balloon angioplasty and SYNERGY XD 2.31I70BO MIRTA, Prox LDA 60% stenosis), who presents today for recommended staged PCI of LAD.  Pt states his symptoms have improved, however, he still occasionally feels a "warm sensation in the chest" occurring independent of any activity.    Pre cath note:  indication:  [ ] STEMI                [ ] NSTEMI                 [ ] Acute coronary syndrome                   [x ]Unstable Angina   [ ] high risk  [ ] intermediate risk  [ ] low risk                   [ ] Stable Angina     non-invasive testing:                          Date:                     result: [ ] high risk  [ ] intermediate risk  [ ] low risk    Anti- Anginal medications:                    [ ] not used                       [ x] used                   [ ] not used but strong indication not to use    Ejection Fraction                   [ ] <29            [ ] 30-39%   [ ] 40-49%     [x ]>50%    CHF                   [ ] active (within last 14 days on meds   [ ] Chronic (on meds but no exacerbation)    COPD                   [ ] mild (on chronic bronchodilators)  [ ] moderate (on chronic steroid therapy)      [ ] severe (indication for home O2 or PACO2 >50)    Other risk factors:                     [ ] Previous MI                     [ ] CVA/ stroke                    [ ] carotid stent/ CEA                    [ ] PVD/PAD- (arterial aneurysm, non-palpable pulses, tortuous vessel with inability to insert catheter, infra-renal dissection, renal or subclavian artery stenosis)                    [ ] diabetic                    [ ] previous CABG                    [ ] Renal Failure     Bleeding Risk: 1.3%	      EF: 60% 9/2022  EKG:  (17 Jan 2023 14:42)    Allergies    No Known Allergies      Patient seen and examined at bedside.   Patient without complaints. Pt ambulated without issues/symptoms  Denies CP, SOB, palpitations, or dizziness    Vital Signs Last 24 Hrs    HR: --  BP: --  RR: --16  SpO2: --      REVIEW OF SYSTEMS:          All negative except as mentioned in HPI    PHYSICAL EXAM:           CONSTITUTIONAL: Well-developed; well-nourished; in no acute distress  	SKIN: warm, dry  	HEAD: Normocephalic; atraumatic  	EYES: PERRL.  	ENT: No nasal discharge, airway clear, mucous membranes moist  	NECK: Supple; non tender.  	CARD: +S1, +S2, no murmurs, gallops, or rubs. Regular rate and rhythm    	RESP: No wheezes, rales or rhonchi. CTA B/L  	ABD: soft ntnd, + BS x 4 quadrants  	EXT: moves all extremities,  no clubbing, cyanosis or edema  	NEURO: Alert and oriented x3, no focal deficits          PSYCH: Cooperative, appropriate          VASCULAR:  + Rad / + PTs / + DPs          EXTREMITY:             	   Right Radial: Dressing D/C/I, access site soft, no hematoma, no pain, + pulses, no sign of infection, no numbness            ECG: sinus alexander @ 59 bpm, no acute ST-T changes                                                                         LABS:                        13.1   4.66  )-----------( 147      ( 18 Jan 2023 14:43 )             39.4     01-18    139  |  103  |  25<H>  ----------------------------<  91  5.3<H>   |  28  |  1.3    Ca    9.6      18 Jan 2023 09:10      A/P:  I discussed the case with Cardiologist Dr. Monk and recommend the following:    S/P PCI:    Intervention:   Shockwave lithotripsy of the proximal LAD  IVUS guided PCI with MIRTA x2 to proximal LAD  AUC score of 7    Implants:   Proximal LAD   SYNERGY XD 3.5X38 mm  SYNERGY XD 4.0 X 8 mm    	         Continue DAPT ( Aspirin 81 mg PO Daily and Plavix 75mg daily), B-Blocker, Statin Therapy                   Patient given 30 day supply of ( Aspirin 81 mg daily and Plavix 75 mg daily ) to take at home                   GI prophylaxis with Protonix                   CBC @ 1500 - 13.1/39.9 stable                    EKG prior to d/c: sinus alexander @ 59 bpm                   IVF: NS completed                   **OOB to chair/Ambulate with assistance                   Monitor access site/ distal pulses                   Patient agreeing to take DAPT for at least one year or as directed by cardiologist                    Pt given instructions on importance of taking antiplatelet medication or risk acute stent thrombosis/death                   Post cath instructions, access site care and activity restrictions reviewed with patient                     Discussed with patient to return to hospital if experience chest pain, shortness breath, dizziness and site bleeding                   Aggressive risk factor modification, diet counseling, smoking cessation discussed with patient                    Benefits of cardiac rehab discussed with patient and all documents sent to cardiac rehab center                   Patient instructed to call cardiac rehab and make initial appointment after first follow-up visit with Cardiologist                    Can discharge patient @ 4:30pm from cardiac standpoint after ambulating without symptoms, access site wnl, labs and ECG reviewed                    Follow up with Cardiology Dr. Monk  in two weeks.  Instructed to call and make an appointment                                       Interventional Cardiology Discharge Note:     OSCAR MATT   67y Male    PAST MEDICAL & SURGICAL HISTORY:  Mild HTN    Hyperlipidemia    Inguinal hernia    CAD (coronary artery disease)    History of percutaneous angioplasty    History of arthroplasty of right hip    H/O hernia repair      HPI:    Patient is a 67y Male with PMH of HTN, HLD, 2 vessel CAD s/p PCI/MIRTA stent 12/7/22 at [facility] (OM1 s/p PCI w/ balloon angioplasty and SYNERGY XD 2.02L38IS MIRTA, Prox LDA 60% stenosis), who presents today for recommended staged PCI of LAD.  Pt states his symptoms have improved, however, he still occasionally feels a "warm sensation in the chest" occurring independent of any activity.    Pre cath note:  indication:  [ ] STEMI                [ ] NSTEMI                 [ ] Acute coronary syndrome                   [x ]Unstable Angina   [ ] high risk  [ ] intermediate risk  [ ] low risk                   [ ] Stable Angina     non-invasive testing:                          Date:                     result: [ ] high risk  [ ] intermediate risk  [ ] low risk    Anti- Anginal medications:                    [ ] not used                       [ x] used                   [ ] not used but strong indication not to use    Ejection Fraction                   [ ] <29            [ ] 30-39%   [ ] 40-49%     [x ]>50%    CHF                   [ ] active (within last 14 days on meds   [ ] Chronic (on meds but no exacerbation)    COPD                   [ ] mild (on chronic bronchodilators)  [ ] moderate (on chronic steroid therapy)      [ ] severe (indication for home O2 or PACO2 >50)    Other risk factors:                     [ ] Previous MI                     [ ] CVA/ stroke                    [ ] carotid stent/ CEA                    [ ] PVD/PAD- (arterial aneurysm, non-palpable pulses, tortuous vessel with inability to insert catheter, infra-renal dissection, renal or subclavian artery stenosis)                    [ ] diabetic                    [ ] previous CABG                    [ ] Renal Failure     Bleeding Risk: 1.3%	      EF: 60% 9/2022  EKG:  (17 Jan 2023 14:42)    Allergies    No Known Allergies      Patient seen and examined at bedside.   Patient without complaints. Pt ambulated without issues/symptoms  Denies CP, SOB, palpitations, or dizziness    Vital Signs Last 24 Hrs    HR: --66  BP: --115/66  RR: --16  SpO2: --99%      REVIEW OF SYSTEMS:          All negative except as mentioned in HPI    PHYSICAL EXAM:           CONSTITUTIONAL: Well-developed; well-nourished; in no acute distress  	SKIN: warm, dry  	HEAD: Normocephalic; atraumatic  	EYES: PERRL.  	ENT: No nasal discharge, airway clear, mucous membranes moist  	NECK: Supple; non tender.  	CARD: +S1, +S2, no murmurs, gallops, or rubs. Regular rate and rhythm    	RESP: No wheezes, rales or rhonchi. CTA B/L  	ABD: soft ntnd, + BS x 4 quadrants  	EXT: moves all extremities,  no clubbing, cyanosis or edema  	NEURO: Alert and oriented x3, no focal deficits          PSYCH: Cooperative, appropriate          VASCULAR:  + Rad / + PTs / + DPs          EXTREMITY:             	   Right Radial: Dressing D/C/I, access site soft, no hematoma, no pain, + pulses, no sign of infection, no numbness            ECG: sinus alexander @ 59 bpm, no acute ST-T changes                                                                         LABS:                        13.1   4.66  )-----------( 147      ( 18 Jan 2023 14:43 )             39.4     01-18    139  |  103  |  25<H>  ----------------------------<  91  5.3<H>   |  28  |  1.3    Ca    9.6      18 Jan 2023 09:10      A/P:  I discussed the case with Cardiologist Dr. Monk and recommend the following:    S/P PCI:    Intervention:   Shockwave lithotripsy of the proximal LAD  IVUS guided PCI with MIRTA x2 to proximal LAD  AUC score of 7    Implants:   Proximal LAD   SYNERGY XD 3.5X38 mm  SYNERGY XD 4.0 X 8 mm    	         Continue DAPT ( Aspirin 81 mg PO Daily and Plavix 75mg daily), B-Blocker, Statin Therapy                   Patient given 30 day supply of ( Aspirin 81 mg daily and Plavix 75 mg daily ) to take at home                   GI prophylaxis with Protonix                   CBC @ 1500 - 13.1/39.9 stable                    EKG prior to d/c: sinus alexander @ 59 bpm                   IVF: NS completed                   **OOB to chair/Ambulate with assistance                   Monitor access site/ distal pulses                   Patient agreeing to take DAPT for at least one year or as directed by cardiologist                    Pt given instructions on importance of taking antiplatelet medication or risk acute stent thrombosis/death                   Post cath instructions, access site care and activity restrictions reviewed with patient                     Discussed with patient to return to hospital if experience chest pain, shortness breath, dizziness and site bleeding                   Aggressive risk factor modification, diet counseling, smoking cessation discussed with patient                    Benefits of cardiac rehab discussed with patient and all documents sent to cardiac rehab center                   Patient instructed to call cardiac rehab and make initial appointment after first follow-up visit with Cardiologist                    Can discharge patient @ 4:30pm from cardiac standpoint after ambulating without symptoms, access site wnl, labs and ECG reviewed                    Follow up with Cardiology Dr. Monk  in two weeks.  Instructed to call and make an appointment

## 2023-01-18 NOTE — ASU PATIENT PROFILE, ADULT - FALL HARM RISK - UNIVERSAL INTERVENTIONS
Bed in lowest position, wheels locked, appropriate side rails in place/Call bell, personal items and telephone in reach/Instruct patient to call for assistance before getting out of bed or chair/Non-slip footwear when patient is out of bed/[place] to call system/Physically safe environment - no spills, clutter or unnecessary equipment/Purposeful Proactive Rounding/Room/bathroom lighting operational, light cord in reach

## 2023-01-20 ENCOUNTER — APPOINTMENT (OUTPATIENT)
Dept: CARDIOLOGY | Facility: CLINIC | Age: 68
End: 2023-01-20

## 2023-01-24 DIAGNOSIS — E78.5 HYPERLIPIDEMIA, UNSPECIFIED: ICD-10-CM

## 2023-01-24 DIAGNOSIS — Z79.02 LONG TERM (CURRENT) USE OF ANTITHROMBOTICS/ANTIPLATELETS: ICD-10-CM

## 2023-01-24 DIAGNOSIS — I25.110 ATHEROSCLEROTIC HEART DISEASE OF NATIVE CORONARY ARTERY WITH UNSTABLE ANGINA PECTORIS: ICD-10-CM

## 2023-01-24 DIAGNOSIS — I10 ESSENTIAL (PRIMARY) HYPERTENSION: ICD-10-CM

## 2023-01-24 DIAGNOSIS — Z79.82 LONG TERM (CURRENT) USE OF ASPIRIN: ICD-10-CM

## 2023-01-24 DIAGNOSIS — Z82.49 FAMILY HISTORY OF ISCHEMIC HEART DISEASE AND OTHER DISEASES OF THE CIRCULATORY SYSTEM: ICD-10-CM

## 2023-01-24 DIAGNOSIS — Z95.5 PRESENCE OF CORONARY ANGIOPLASTY IMPLANT AND GRAFT: ICD-10-CM

## 2023-01-25 ENCOUNTER — APPOINTMENT (OUTPATIENT)
Dept: CARDIOLOGY | Facility: CLINIC | Age: 68
End: 2023-01-25
Payer: MEDICARE

## 2023-01-25 VITALS — BODY MASS INDEX: 28.02 KG/M2 | WEIGHT: 200.19 LBS | HEIGHT: 71 IN

## 2023-01-25 DIAGNOSIS — R94.31 ABNORMAL ELECTROCARDIOGRAM [ECG] [EKG]: ICD-10-CM

## 2023-01-25 DIAGNOSIS — G60.9 HEREDITARY AND IDIOPATHIC NEUROPATHY, UNSPECIFIED: ICD-10-CM

## 2023-01-25 PROBLEM — I25.10 ATHEROSCLEROTIC HEART DISEASE OF NATIVE CORONARY ARTERY WITHOUT ANGINA PECTORIS: Chronic | Status: ACTIVE | Noted: 2023-01-17

## 2023-01-25 PROCEDURE — 99214 OFFICE O/P EST MOD 30 MIN: CPT

## 2023-01-25 NOTE — HISTORY OF PRESENT ILLNESS
[FreeTextEntry1] : pt with significant family h/o, HTN, HLD CAD s/p stent 12/22 and 1/23 PLAD MIRTA, jailed D1,  SPINAL STENOSIS AND NEUROPATHY with numbness to ankles. CAD S/P PCI OM1 12/7/22 VERY HIGH CALCIUM SCORE ELEVATED LPa 172 \par \par pt here as was concerned more tired doing activities over the last couple months and was concerned. pt had hip problems and  stopped using treadmill 6 months ago. pt also has a chest tightness at rest lasting minutes with no sob or n/v. \par pt had signficant family h/o \par \par 11/22: pt with signifcant with LAD disease and 0.78 \par CAC: 1448\par \par \par PT s/p stent for Om1 stent, has residual LAD disease for staged PCI in 4 to 6 weeks. \par 11/8/22:  HDL 39 \par arm is normal. \par pt on atorvastatin 40 mg po qhs and clopidogrel 75 \par \par 1/23/22: pt s/p PCI LAD here for f/u. pt concerned as he has D1 jailed and d/w patient at length.

## 2023-01-25 NOTE — DISCUSSION/SUMMARY
[FreeTextEntry1] : pt with family h/o signficant with ? anginal symptoms \par ekg mildly abnormal \par get CTA as significant family /o \par get echo \par get bloodwork \par \par 11/18/22: \par pt with signifcant with LAD disease and 0.78 \par CAC: 1448\par pt for cardiac cath \par \par 12/14/22: \par continue GDMT DAPT for 6 months \par for staged pci of LAD after 1/7/22 \par Lpa elevated \par f/u after cath \par f/u after cath. \par pt told not to be exertional. \par f/u in 6 months \par 4mm nodule will see about seeing a pulmonary next visit.

## 2023-01-25 NOTE — CARDIOLOGY SUMMARY
[de-identified] : 1. Dense calcified plaque precludes evaluation of the mid LAD and proximal left circumflex arteries.\par \par Atherosclerotic disease elsewhere within the coronary arteries contributing up to mild to moderate stenosis within the distal RCA.\par \par The total Agatston coronary artery calcium score equals 1445, which corresponds to 0th percentile for age, gender and ethnicity.\par \par CAD-RADS 2/3N.\par \par This case was sent for CT FFR analysis.\par \par 2. 4 mm subsolid right middle lobe nodule, CT of the chest in 12 months may be obtained for follow-up.\par \par FFR 0.78 LAD and 0.80 LCx

## 2023-01-25 NOTE — HISTORY OF PRESENT ILLNESS
[FreeTextEntry1] : pt with significant family h/o, HTN, HLD CAD s/p stent 12/22 and 1/23 PLAD MIRTA, jailed D1,  SPINAL STENOSIS AND NEUROPATHY with numbness to ankles. CAD S/P PCI OM1 12/7/22 VERY HIGH CALCIUM SCORE ELEVATED LPa 172  HIATAL HERNIA \par \par pt here as was concerned more tired doing activities over the last couple months and was concerned. pt had hip problems and  stopped using treadmill 6 months ago. pt also has a chest tightness at rest lasting minutes with no sob or n/v. \par pt had signficant family h/o \par \par 11/22: pt with signifcant with LAD disease and 0.78 \par CAC: 1448\par \par \par PT s/p stent for Om1 stent, has residual LAD disease for staged PCI in 4 to 6 weeks. \par 11/8/22:  HDL 39 \par arm is normal. \par pt on atorvastatin 40 mg po qhs and clopidogrel 75 \par \par 1/23/22: pt s/p PCI LAD here for f/u. pt concerned as he has D1 jailed and d/w patient at length. \par pt had some abdominal pain that resolved after procedure had some chest pressure that improved. pt symptoms seem \par KEEP ldl is normal, pt to continue pantoprazole for now.

## 2023-01-25 NOTE — DISCUSSION/SUMMARY
[FreeTextEntry1] : pt with family h/o signficant with ? anginal symptoms \par ekg mildly abnormal \par get CTA as significant family /o \par get echo \par get bloodwork \par \par 11/18/22: \par pt with signifcant with LAD disease and 0.78 \par CAC: 1448\par pt for cardiac cath \par \par 1/25/23: medical management  d/w pt at length \par HAS ELEVATED LPa \par get carotids as risk factors \par get bloodwork \par pt complains of ED and has not tried viagra \par will give him. \par \par 12/14/22: \par continue GDMT DAPT for 6 months \par for staged pci of LAD after 1/7/22 \par Lpa elevated \par f/u after cath \par f/u after cath. \par pt told not to be exertional. \par f/u in 6 months \par 4mm nodule will see about seeing a pulmonary next visit.

## 2023-01-25 NOTE — CARDIOLOGY SUMMARY
[de-identified] : 1. Dense calcified plaque precludes evaluation of the mid LAD and proximal left circumflex arteries.\par \par Atherosclerotic disease elsewhere within the coronary arteries contributing up to mild to moderate stenosis within the distal RCA.\par \par The total Agatston coronary artery calcium score equals 1445, which corresponds to 0th percentile for age, gender and ethnicity.\par \par CAD-RADS 2/3N.\par \par This case was sent for CT FFR analysis.\par \par 2. 4 mm subsolid right middle lobe nodule, CT of the chest in 12 months may be obtained for follow-up.\par \par FFR 0.78 LAD and 0.80 LCx

## 2023-02-08 ENCOUNTER — APPOINTMENT (OUTPATIENT)
Dept: UROLOGY | Facility: CLINIC | Age: 68
End: 2023-02-08
Payer: MEDICARE

## 2023-02-08 VITALS
HEART RATE: 65 BPM | BODY MASS INDEX: 27.3 KG/M2 | HEIGHT: 71 IN | WEIGHT: 195 LBS | DIASTOLIC BLOOD PRESSURE: 75 MMHG | SYSTOLIC BLOOD PRESSURE: 133 MMHG

## 2023-02-08 DIAGNOSIS — Z80.49 FAMILY HISTORY OF MALIGNANT NEOPLASM OF OTHER GENITAL ORGANS: ICD-10-CM

## 2023-02-08 DIAGNOSIS — Z78.9 OTHER SPECIFIED HEALTH STATUS: ICD-10-CM

## 2023-02-08 DIAGNOSIS — Z80.3 FAMILY HISTORY OF MALIGNANT NEOPLASM OF BREAST: ICD-10-CM

## 2023-02-08 DIAGNOSIS — Z00.00 ENCOUNTER FOR GENERAL ADULT MEDICAL EXAMINATION W/OUT ABNORMAL FINDINGS: ICD-10-CM

## 2023-02-08 LAB
BILIRUB UR QL STRIP: NORMAL
COLLECTION METHOD: NORMAL
GLUCOSE UR-MCNC: NORMAL
HCG UR QL: 0.2 EU/DL
HGB UR QL STRIP.AUTO: NORMAL
KETONES UR-MCNC: NORMAL
LEUKOCYTE ESTERASE UR QL STRIP: NORMAL
NITRITE UR QL STRIP: NORMAL
PH UR STRIP: 6.5
PROT UR STRIP-MCNC: NORMAL
SP GR UR STRIP: 1.01

## 2023-02-08 PROCEDURE — 99203 OFFICE O/P NEW LOW 30 MIN: CPT

## 2023-04-25 ENCOUNTER — APPOINTMENT (OUTPATIENT)
Dept: CARDIOLOGY | Facility: CLINIC | Age: 68
End: 2023-04-25
Payer: MEDICARE

## 2023-04-25 PROCEDURE — 93880 EXTRACRANIAL BILAT STUDY: CPT

## 2023-06-05 ENCOUNTER — APPOINTMENT (OUTPATIENT)
Dept: CARDIOLOGY | Facility: CLINIC | Age: 68
End: 2023-06-05
Payer: MEDICARE

## 2023-06-05 VITALS — BODY MASS INDEX: 27.75 KG/M2 | HEIGHT: 71 IN | WEIGHT: 198.19 LBS

## 2023-06-05 VITALS — HEART RATE: 66 BPM | SYSTOLIC BLOOD PRESSURE: 110 MMHG | DIASTOLIC BLOOD PRESSURE: 70 MMHG

## 2023-06-05 PROCEDURE — 99214 OFFICE O/P EST MOD 30 MIN: CPT

## 2023-06-05 NOTE — HISTORY OF PRESENT ILLNESS
[FreeTextEntry1] : pt with significant family h/o, HTN, HLD CAD s/p OM1 stent 12/22 and 1/23 PLAD MIRTA, jailed D1,  SPINAL STENOSIS AND NEUROPATHY with numbness to ankles.  VERY HIGH CALCIUM SCORE ELEVATED 1448 LPa 172  HIATAL HERNIA \par \par \par 11/22: pt with significant with LAD disease and 0.78 \par CAC: 1448\par \par 11/8/22:  HDL 39 \par pt on atorvastatin 40 mg po qhs and clopidogrel 75 \par \par 1/23/22: pt s/p PCI LAD here for f/u. pt concerned as he has D1 jailed and d/w patient at length. \par pt had some abdominal pain that resolved after procedure had some chest pressure that improved. pt symptoms seem \par KEEP ldl is normal, pt to continue pantoprazole for now. \par \par 6/5/23: \par ldl 105, hdl 62,  Lpa: decreased to 155 on atorvastatin 40 mg po qhs, pt denies any further chest pressure and was in [employer], pt says working [employer] and working like 22 year old. pt not exercising much and no events, pt having a cocktail every afternoon. \par CAROTID: 50% CAROTID DZ MODERATE ATHEROSCLEROSIS B/L ICA

## 2023-06-05 NOTE — DISCUSSION/SUMMARY
[FreeTextEntry1] : Lpa elevated \par 4mm nodule F/U WITH DR. DOUGLAS. \par get bloodwork \par increase atorvastatin 80 mg po qhs\par get echo

## 2023-06-05 NOTE — CARDIOLOGY SUMMARY
[de-identified] : 1. Dense calcified plaque precludes evaluation of the mid LAD and proximal left circumflex arteries.\par \par Atherosclerotic disease elsewhere within the coronary arteries contributing up to mild to moderate stenosis within the distal RCA.\par \par The total Agatston coronary artery calcium score equals 1445, which corresponds to 0th percentile for age, gender and ethnicity.\par \par CAD-RADS 2/3N.\par \par This case was sent for CT FFR analysis.\par \par 2. 4 mm subsolid right middle lobe nodule, CT of the chest in 12 months may be obtained for follow-up.\par \par FFR 0.78 LAD and 0.80 LCx

## 2023-08-28 RX ORDER — CLOPIDOGREL BISULFATE 75 MG/1
75 TABLET, FILM COATED ORAL
Qty: 90 | Refills: 3 | Status: ACTIVE | COMMUNITY
Start: 2023-08-28 | End: 1900-01-01

## 2023-09-18 ENCOUNTER — APPOINTMENT (OUTPATIENT)
Dept: CARDIOLOGY | Facility: CLINIC | Age: 68
End: 2023-09-18
Payer: MEDICARE

## 2023-09-18 PROCEDURE — 93306 TTE W/DOPPLER COMPLETE: CPT

## 2023-10-02 ENCOUNTER — APPOINTMENT (OUTPATIENT)
Dept: CARDIOLOGY | Facility: CLINIC | Age: 68
End: 2023-10-02
Payer: MEDICARE

## 2023-10-02 VITALS — BODY MASS INDEX: 26.88 KG/M2 | WEIGHT: 192 LBS | HEIGHT: 71 IN

## 2023-10-02 VITALS — HEART RATE: 66 BPM | DIASTOLIC BLOOD PRESSURE: 70 MMHG | SYSTOLIC BLOOD PRESSURE: 110 MMHG

## 2023-10-02 PROCEDURE — 99214 OFFICE O/P EST MOD 30 MIN: CPT

## 2023-10-02 PROCEDURE — 93000 ELECTROCARDIOGRAM COMPLETE: CPT

## 2023-10-02 RX ORDER — SILDENAFIL 100 MG/1
100 TABLET, FILM COATED ORAL
Qty: 10 | Refills: 5 | Status: ACTIVE | COMMUNITY
Start: 2023-01-25 | End: 1900-01-01

## 2023-10-02 RX ORDER — EZETIMIBE 10 MG/1
10 TABLET ORAL
Qty: 90 | Refills: 3 | Status: ACTIVE | COMMUNITY
Start: 2023-10-02 | End: 1900-01-01

## 2023-12-10 ENCOUNTER — OUTPATIENT (OUTPATIENT)
Dept: OUTPATIENT SERVICES | Facility: HOSPITAL | Age: 68
LOS: 1 days | End: 2023-12-10
Payer: MEDICARE

## 2023-12-10 DIAGNOSIS — Z98.890 OTHER SPECIFIED POSTPROCEDURAL STATES: Chronic | ICD-10-CM

## 2023-12-10 DIAGNOSIS — Z00.8 ENCOUNTER FOR OTHER GENERAL EXAMINATION: ICD-10-CM

## 2023-12-10 DIAGNOSIS — R91.8 OTHER NONSPECIFIC ABNORMAL FINDING OF LUNG FIELD: ICD-10-CM

## 2023-12-10 PROCEDURE — 71250 CT THORAX DX C-: CPT

## 2023-12-10 PROCEDURE — 71250 CT THORAX DX C-: CPT | Mod: 26,MH

## 2023-12-11 DIAGNOSIS — R91.8 OTHER NONSPECIFIC ABNORMAL FINDING OF LUNG FIELD: ICD-10-CM

## 2024-01-29 ENCOUNTER — APPOINTMENT (OUTPATIENT)
Dept: CARDIOLOGY | Facility: CLINIC | Age: 69
End: 2024-01-29
Payer: MEDICARE

## 2024-01-29 PROCEDURE — 93880 EXTRACRANIAL BILAT STUDY: CPT

## 2024-02-08 ENCOUNTER — APPOINTMENT (OUTPATIENT)
Dept: CARDIOLOGY | Facility: CLINIC | Age: 69
End: 2024-02-08
Payer: MEDICARE

## 2024-02-08 VITALS — WEIGHT: 190 LBS | HEIGHT: 71 IN | BODY MASS INDEX: 26.6 KG/M2

## 2024-02-08 VITALS — DIASTOLIC BLOOD PRESSURE: 70 MMHG | HEART RATE: 65 BPM | SYSTOLIC BLOOD PRESSURE: 110 MMHG

## 2024-02-08 DIAGNOSIS — I25.10 ATHEROSCLEROTIC HEART DISEASE OF NATIVE CORONARY ARTERY W/OUT ANGINA PECTORIS: ICD-10-CM

## 2024-02-08 PROCEDURE — 99214 OFFICE O/P EST MOD 30 MIN: CPT

## 2024-03-07 RX ORDER — ATORVASTATIN CALCIUM 80 MG/1
80 TABLET, FILM COATED ORAL
Qty: 90 | Refills: 3 | Status: ACTIVE | COMMUNITY
Start: 1900-01-01 | End: 1900-01-01

## 2024-03-13 ENCOUNTER — APPOINTMENT (OUTPATIENT)
Dept: UROLOGY | Facility: CLINIC | Age: 69
End: 2024-03-13
Payer: MEDICARE

## 2024-03-13 DIAGNOSIS — N28.1 CYST OF KIDNEY, ACQUIRED: ICD-10-CM

## 2024-03-13 DIAGNOSIS — N13.8 BENIGN PROSTATIC HYPERPLASIA WITH LOWER URINARY TRACT SYMPMS: ICD-10-CM

## 2024-03-13 DIAGNOSIS — N40.1 BENIGN PROSTATIC HYPERPLASIA WITH LOWER URINARY TRACT SYMPMS: ICD-10-CM

## 2024-03-13 LAB
BILIRUB UR QL STRIP: NORMAL
COLLECTION METHOD: NORMAL
GLUCOSE UR-MCNC: NORMAL
HCG UR QL: 0.2 EU/DL
HGB UR QL STRIP.AUTO: NORMAL
KETONES UR-MCNC: NORMAL
LEUKOCYTE ESTERASE UR QL STRIP: NORMAL
NITRITE UR QL STRIP: NORMAL
PH UR STRIP: 6
PROT UR STRIP-MCNC: NORMAL
SP GR UR STRIP: 1.01

## 2024-03-13 PROCEDURE — 99213 OFFICE O/P EST LOW 20 MIN: CPT

## 2024-03-13 PROCEDURE — G2211 COMPLEX E/M VISIT ADD ON: CPT

## 2024-05-20 ENCOUNTER — RX RENEWAL (OUTPATIENT)
Age: 69
End: 2024-05-20

## 2024-05-20 RX ORDER — METOPROLOL TARTRATE 25 MG/1
25 TABLET, FILM COATED ORAL TWICE DAILY
Qty: 180 | Refills: 3 | Status: ACTIVE | COMMUNITY
Start: 1900-01-01 | End: 1900-01-01

## 2024-05-31 NOTE — DISCUSSION/SUMMARY
[FreeTextEntry1] : Lpa elevated  4mm nodule F/U WITH DR. DOUGLAS.  continue atorvastatin 80 mg po qhs continue ezetimbe now to goal  continue metoprolol  continue aspirin lifelong.  pt can stop PLAVIX NOW.  carotid moderate atherosclerosis  check lfts, lft elevated now repeat in 4 months  as long as less than 3 x ULN will continue statin/ezetimbe.  get bloodwork f/u in 4 months  maybe start repatha get bloodwork Lpa
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
## 2024-05-31 NOTE — CARDIOLOGY SUMMARY
[de-identified] : nsr normal ecg  [de-identified] : 1. Dense calcified plaque precludes evaluation of the mid LAD and proximal left circumflex arteries.  Atherosclerotic disease elsewhere within the coronary arteries contributing up to mild to moderate stenosis within the distal RCA.  The total Agatston coronary artery calcium score equals 1445, which corresponds to 0th percentile for age, gender and ethnicity.  CAD-RADS 2/3N.  This case was sent for CT FFR analysis.  2. 4 mm subsolid right middle lobe nodule, CT of the chest in 12 months may be obtained for follow-up.  FFR 0.78 LAD and 0.80 LCx

## 2024-06-10 ENCOUNTER — APPOINTMENT (OUTPATIENT)
Dept: CARDIOLOGY | Facility: CLINIC | Age: 69
End: 2024-06-10
Payer: MEDICARE

## 2024-06-10 VITALS — HEIGHT: 71 IN | BODY MASS INDEX: 26.6 KG/M2 | WEIGHT: 190 LBS

## 2024-06-10 VITALS — HEART RATE: 65 BPM | SYSTOLIC BLOOD PRESSURE: 110 MMHG | DIASTOLIC BLOOD PRESSURE: 70 MMHG

## 2024-06-10 DIAGNOSIS — E78.2 MIXED HYPERLIPIDEMIA: ICD-10-CM

## 2024-06-10 DIAGNOSIS — I10 ESSENTIAL (PRIMARY) HYPERTENSION: ICD-10-CM

## 2024-06-10 DIAGNOSIS — I20.9 ANGINA PECTORIS, UNSPECIFIED: ICD-10-CM

## 2024-06-10 DIAGNOSIS — R93.1 ABNORMAL FINDINGS ON DIAGNOSTIC IMAGING OF HEART AND CORONARY CIRCULATION: ICD-10-CM

## 2024-06-10 DIAGNOSIS — Z98.61 CORONARY ANGIOPLASTY STATUS: ICD-10-CM

## 2024-06-10 DIAGNOSIS — E78.41 ELEVATED LIPOPROTEIN(A): ICD-10-CM

## 2024-06-10 DIAGNOSIS — I65.23 OCCLUSION AND STENOSIS OF BILATERAL CAROTID ARTERIES: ICD-10-CM

## 2024-06-10 PROCEDURE — G2211 COMPLEX E/M VISIT ADD ON: CPT

## 2024-06-10 PROCEDURE — 99214 OFFICE O/P EST MOD 30 MIN: CPT

## 2024-06-10 RX ORDER — LEVOTHYROXINE SODIUM 25 UG/1
25 TABLET ORAL
Refills: 0 | Status: ACTIVE | COMMUNITY

## 2024-06-10 NOTE — DISCUSSION/SUMMARY
[FreeTextEntry1] : Jailed D1 on cath 1/23 on GDMT  if symptomatic severe will cath  Lpa elevated will setup for meds when available  4mm nodule F/U WITH DR. DOUGLAS.  continue atorvastatin 80 mg po qhs continue ezetimibe 10 mg poqhs   continue metoprolol  continue aspirin lifelong.  carotid moderate atherosclerosis  lfts normalized in 6/24  Pt says [employer] as wife pays 400 a month.  ldl controlled, no repatha for now.  2 d echo/f/u in 6 months bloodwork.
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
## 2024-06-10 NOTE — CARDIOLOGY SUMMARY
[de-identified] : nsr normal ecg  [de-identified] : 1. Dense calcified plaque precludes evaluation of the mid LAD and proximal left circumflex arteries.  Atherosclerotic disease elsewhere within the coronary arteries contributing up to mild to moderate stenosis within the distal RCA.  The total Agatston coronary artery calcium score equals 1445, which corresponds to 0th percentile for age, gender and ethnicity.  CAD-RADS 2/3N.  This case was sent for CT FFR analysis.  2. 4 mm subsolid right middle lobe nodule, CT of the chest in 12 months may be obtained for follow-up.  FFR 0.78 LAD and 0.80 LCx

## 2024-09-09 ENCOUNTER — RX RENEWAL (OUTPATIENT)
Age: 69
End: 2024-09-09

## 2024-09-30 ENCOUNTER — APPOINTMENT (OUTPATIENT)
Dept: CARDIOLOGY | Facility: CLINIC | Age: 69
End: 2024-09-30
Payer: MEDICARE

## 2024-09-30 PROCEDURE — 93306 TTE W/DOPPLER COMPLETE: CPT

## 2024-10-15 ENCOUNTER — RX RENEWAL (OUTPATIENT)
Age: 69
End: 2024-10-15

## 2024-12-18 ENCOUNTER — APPOINTMENT (OUTPATIENT)
Dept: CARDIOLOGY | Facility: CLINIC | Age: 69
End: 2024-12-18
Payer: MEDICARE

## 2024-12-18 ENCOUNTER — NON-APPOINTMENT (OUTPATIENT)
Age: 69
End: 2024-12-18

## 2024-12-18 VITALS
BODY MASS INDEX: 27.2 KG/M2 | SYSTOLIC BLOOD PRESSURE: 118 MMHG | DIASTOLIC BLOOD PRESSURE: 84 MMHG | WEIGHT: 195 LBS | HEART RATE: 61 BPM

## 2024-12-18 DIAGNOSIS — I20.9 ANGINA PECTORIS, UNSPECIFIED: ICD-10-CM

## 2024-12-18 DIAGNOSIS — E78.41 ELEVATED LIPOPROTEIN(A): ICD-10-CM

## 2024-12-18 DIAGNOSIS — E78.2 MIXED HYPERLIPIDEMIA: ICD-10-CM

## 2024-12-18 DIAGNOSIS — R93.1 ABNORMAL FINDINGS ON DIAGNOSTIC IMAGING OF HEART AND CORONARY CIRCULATION: ICD-10-CM

## 2024-12-18 DIAGNOSIS — I65.23 OCCLUSION AND STENOSIS OF BILATERAL CAROTID ARTERIES: ICD-10-CM

## 2024-12-18 DIAGNOSIS — I10 ESSENTIAL (PRIMARY) HYPERTENSION: ICD-10-CM

## 2024-12-18 DIAGNOSIS — I25.10 ATHEROSCLEROTIC HEART DISEASE OF NATIVE CORONARY ARTERY W/OUT ANGINA PECTORIS: ICD-10-CM

## 2024-12-18 DIAGNOSIS — Z98.61 CORONARY ANGIOPLASTY STATUS: ICD-10-CM

## 2024-12-18 PROCEDURE — G2211 COMPLEX E/M VISIT ADD ON: CPT

## 2024-12-18 PROCEDURE — 99215 OFFICE O/P EST HI 40 MIN: CPT

## 2025-05-16 ENCOUNTER — RX RENEWAL (OUTPATIENT)
Age: 70
End: 2025-05-16

## 2025-06-04 ENCOUNTER — APPOINTMENT (OUTPATIENT)
Dept: CARDIOLOGY | Facility: CLINIC | Age: 70
End: 2025-06-04

## 2025-06-04 PROCEDURE — 93880 EXTRACRANIAL BILAT STUDY: CPT

## 2025-06-16 ENCOUNTER — NON-APPOINTMENT (OUTPATIENT)
Age: 70
End: 2025-06-16

## 2025-06-16 ENCOUNTER — APPOINTMENT (OUTPATIENT)
Dept: CARDIOLOGY | Facility: CLINIC | Age: 70
End: 2025-06-16
Payer: MEDICARE

## 2025-06-16 VITALS
BODY MASS INDEX: 27.3 KG/M2 | SYSTOLIC BLOOD PRESSURE: 110 MMHG | HEIGHT: 71 IN | WEIGHT: 195 LBS | OXYGEN SATURATION: 93 % | HEART RATE: 69 BPM | DIASTOLIC BLOOD PRESSURE: 68 MMHG

## 2025-06-16 PROCEDURE — G2211 COMPLEX E/M VISIT ADD ON: CPT

## 2025-06-16 PROCEDURE — 99214 OFFICE O/P EST MOD 30 MIN: CPT
